# Patient Record
Sex: FEMALE | Race: AMERICAN INDIAN OR ALASKA NATIVE
[De-identification: names, ages, dates, MRNs, and addresses within clinical notes are randomized per-mention and may not be internally consistent; named-entity substitution may affect disease eponyms.]

---

## 2020-11-07 ENCOUNTER — HOSPITAL ENCOUNTER (EMERGENCY)
Dept: HOSPITAL 5 - ED | Age: 30
Discharge: HOME | End: 2020-11-07
Payer: MEDICAID

## 2020-11-07 VITALS — SYSTOLIC BLOOD PRESSURE: 103 MMHG | DIASTOLIC BLOOD PRESSURE: 69 MMHG

## 2020-11-07 DIAGNOSIS — F17.200: ICD-10-CM

## 2020-11-07 DIAGNOSIS — N39.0: Primary | ICD-10-CM

## 2020-11-07 DIAGNOSIS — Z98.890: ICD-10-CM

## 2020-11-07 DIAGNOSIS — N83.201: ICD-10-CM

## 2020-11-07 DIAGNOSIS — Z79.899: ICD-10-CM

## 2020-11-07 DIAGNOSIS — Z88.6: ICD-10-CM

## 2020-11-07 DIAGNOSIS — N93.8: ICD-10-CM

## 2020-11-07 LAB
BASOPHILS # (AUTO): 0 K/MM3 (ref 0–0.1)
BASOPHILS NFR BLD AUTO: 1 % (ref 0–1.8)
BILIRUB UR QL STRIP: (no result)
BLOOD UR QL VISUAL: (no result)
EOSINOPHIL # BLD AUTO: 0.1 K/MM3 (ref 0–0.4)
EOSINOPHIL NFR BLD AUTO: 2.1 % (ref 0–4.3)
HCT VFR BLD CALC: 32.7 % (ref 30.3–42.9)
HGB BLD-MCNC: 10.1 GM/DL (ref 10.1–14.3)
LYMPHOCYTES # BLD AUTO: 2.3 K/MM3 (ref 1.2–5.4)
LYMPHOCYTES NFR BLD AUTO: 47.2 % (ref 13.4–35)
MCHC RBC AUTO-ENTMCNC: 31 % (ref 30–34)
MCV RBC AUTO: 71 FL (ref 79–97)
MONOCYTES # (AUTO): 0.4 K/MM3 (ref 0–0.8)
MONOCYTES % (AUTO): 7.7 % (ref 0–7.3)
MUCOUS THREADS #/AREA URNS HPF: (no result) /HPF
PH UR STRIP: 5 [PH] (ref 5–7)
PLATELET # BLD: 281 K/MM3 (ref 140–440)
RBC # BLD AUTO: 4.63 M/MM3 (ref 3.65–5.03)
RBC #/AREA URNS HPF: > 182 /HPF (ref 0–6)
UROBILINOGEN UR-MCNC: < 2 MG/DL (ref ?–2)
WBC #/AREA URNS HPF: 18 /HPF (ref 0–6)

## 2020-11-07 PROCEDURE — 86900 BLOOD TYPING SEROLOGIC ABO: CPT

## 2020-11-07 PROCEDURE — 87086 URINE CULTURE/COLONY COUNT: CPT

## 2020-11-07 PROCEDURE — 36415 COLL VENOUS BLD VENIPUNCTURE: CPT

## 2020-11-07 PROCEDURE — 85025 COMPLETE CBC W/AUTO DIFF WBC: CPT

## 2020-11-07 PROCEDURE — 76856 US EXAM PELVIC COMPLETE: CPT

## 2020-11-07 PROCEDURE — 86901 BLOOD TYPING SEROLOGIC RH(D): CPT

## 2020-11-07 PROCEDURE — 76830 TRANSVAGINAL US NON-OB: CPT

## 2020-11-07 PROCEDURE — 86850 RBC ANTIBODY SCREEN: CPT

## 2020-11-07 PROCEDURE — 81001 URINALYSIS AUTO W/SCOPE: CPT

## 2020-11-07 PROCEDURE — 84702 CHORIONIC GONADOTROPIN TEST: CPT

## 2020-11-07 NOTE — EVENT NOTE
ED Screening Note


Date of service: 11/07/20


Time: 15:11


ED Screening Note: 


30-year-old -American female presents to the emergency room for abdominal

pain with cramping vaginal bleeding.  Last menstrual period 10/25/2020. 10-15 

pads son far today. Having blood clots. On no birth control. no OB/GYN. 





This initial assessment/diagnostic orders/clinical plan/treatment(s) is/are 

subject to change based on patients health status, clinical progression and re-

assessment by fellow clinical providers in the ED. Further treatment and workup 

at subsequent clinical providers discretion. Patient/guardian urged not to elope

from the ED as their condition may be serious if not clinically assessed and 

managed. 





Initial orders include:

## 2020-11-07 NOTE — ULTRASOUND REPORT
ULTRASOUND PELVIS  



INDICATION / CLINICAL INFORMATION: 

Pelvic pain.



TECHNIQUE:

Transabdominal and Transvaginal.

Duplex Color Doppler used: Yes. 



COMPARISON: 

None available



FINDINGS:

UTERUS: The uterus measures 7.1 cm in length. The endometrial stripe measures 14 mm. Uterus is retrov
erted. There is a small abutting cysts at the cervix.



RIGHT ADNEXA: The right ovary measures 5 cm in length. There is a 3.9 cm complex cyst in the right ov
heber. Normal color Doppler blood flow.



LEFT ADNEXA: Small simple cyst is noted in the left ovary. The left ovary measures 3.2 cm Normal colo
r Doppler blood flow.



URINARY BLADDER: No significant abnormality. 

FREE FLUID: Small amount is noted

ADDITIONAL FINDINGS: None.



IMPRESSION:

1. There is a 3.9 cm complex cyst in the right ovary.



Signer Name: Charles Dasilva MD 

Signed: 11/7/2020 5:19 PM

Workstation Name: [id number]

## 2020-11-07 NOTE — EMERGENCY DEPARTMENT REPORT
HPI





- General


Chief Complaint: Abdominal Pain


Time Seen by Provider: 20 15:10





- HPI


HPI: 





This is a 30-year-old -American female who presents to the emergency 

department with a complaint of prolonged menstrual cycle causing heavy vaginal 

bleeding, nausea and vomiting, and pelvic cramping/discomfort.  Currently her 

pain is 5 out of 10 in intensity.  The patient started her last menstrual cycle 

on .  She denies any fever, dysuria, vaginal discharge, back pain.  

Patient has a history of a previous ovarian cysts and subsequent surgical 

removal of that cyst.  Patient says "I did some research on the Internet and I 

think I could have fibroids", but the patient denies any known history of 

fibroids.  She does not have any OB/GYN.





ED Past Medical Hx





- Past Medical History


Previous Medical History?: Yes


Additional medical history: Abd pain, Vaginal delivery x 1, Abnormal uterine 

bleeding and heavy menses





- Surgical History


Past Surgical History?: Yes


Additional Surgical History:  x 1





- Social History


Smoking Status: Current Every Day Smoker


Substance Use Type: Alcohol





- Medications


Home Medications: 


                                Home Medications











 Medication  Instructions  Recorded  Confirmed  Last Taken  Type


 


Docusate Sodium [Colace CAP] 100 mg PO BID PRN #30 capsule 18  Unknown Rx


 


Ibuprofen [Motrin] 800 mg PO Q8HR PRN #20 tablet 18  Unknown Rx


 


polyethylene glycoL 3350 [Miralax 17 gm PO QDAY PRN #1 box 18  Unknown Rx





3350]     


 


HYDROcodone/APAP 5-325 [[place] 1 each PO Q6HR PRN #10 tablet 19  Unknown Rx





5/325]     


 


HYDROcodone/APAP 5-325 [[place] 1 each PO Q6HR PRN #8 tablet 20  Unknown Rx





5/325]     


 


Metoclopramide [Reglan] 10 mg PO TID PRN #15 tab 20  Unknown Rx


 


Nitrofurantoin Mono/M-Cryst 100 mg PO Q12HR #14 capsule 20  Unknown Rx





[Macrobid CAP]     


 


medroxyPROGESTERone ACETATE 10 mg PO QDAY #7 tablet 20  Unknown Rx





[Provera]     














ED Review of Systems


ROS: 


Stated complaint: FIBROIDS/SEVERE PAIN


Other details as noted in HPI





Comment: All other systems reviewed and negative


Constitutional: denies: chills, fever


Eyes: denies: eye pain, vision change


ENT: denies: ear pain, throat pain


Respiratory: denies: cough, shortness of breath


Cardiovascular: denies: chest pain, palpitations


Gastrointestinal: abdominal pain, nausea, vomiting


Genitourinary: abnormal menses.  denies: dysuria, discharge


Musculoskeletal: denies: back pain, arthralgia


Skin: denies: rash, lesions


Neurological: denies: headache, weakness





Physical Exam





- Physical Exam


Vital Signs: 


                                   Vital Signs











  20





  15:00


 


Temperature 98.3 F


 


Pulse Rate 68


 


Respiratory 20





Rate 


 


Blood Pressure 100/65





[Right] 


 


O2 Sat by Pulse 100





Oximetry 











Physical Exam: 





GENERAL: The patient is well-developed well-nourished.


HENT: Normocephalic.  Atraumatic.    Patient has moist mucous membranes.


EYES: Extraocular motions are intact.  


NECK: Supple. Trachea is midline.


CHEST/LUNGS: Clear to auscultation.  There is no respiratory distress noted.


HEART/CARDIOVASCULAR: Regular.  There is no tachycardia. 


ABDOMEN: Abdomen is soft, nontender.  Patient has normal bowel sounds.  There is

 no abdominal distention.


SKIN: Skin is warm and dry. 


NEURO: The patient is awake, alert, and oriented.  The patient is cooperative.  

Normal speech.


MUSCULOSKELETAL: There is no tenderness or deformity.  There is no limitation 

range of motion.  





ED Course


                                   Vital Signs











  20





  15:00


 


Temperature 98.3 F


 


Pulse Rate 68


 


Respiratory 20





Rate 


 


Blood Pressure 100/65





[Right] 


 


O2 Sat by Pulse 100





Oximetry 














- Reevaluation(s)


Reevaluation #1: 





20 18:40


                                   Lab Results











  20 Range/Units





  15:18 15:18 15:18 


 


WBC  4.8    (4.5-11.0)  K/mm3


 


RBC  4.63    (3.65-5.03)  M/mm3


 


Hgb  10.1    (10.1-14.3)  gm/dl


 


Hct  32.7    (30.3-42.9)  %


 


MCV  71 L    (79-97)  fl


 


MCH  22 L    (28-32)  pg


 


MCHC  31    (30-34)  %


 


RDW  21.1 H    (13.2-15.2)  %


 


Plt Count  281    (140-440)  K/mm3


 


Lymph % (Auto)  47.2 H    (13.4-35.0)  %


 


Mono % (Auto)  7.7 H    (0.0-7.3)  %


 


Eos % (Auto)  2.1    (0.0-4.3)  %


 


Baso % (Auto)  1.0    (0.0-1.8)  %


 


Lymph # (Auto)  2.3    (1.2-5.4)  K/mm3


 


Mono # (Auto)  0.4    (0.0-0.8)  K/mm3


 


Eos # (Auto)  0.1    (0.0-0.4)  K/mm3


 


Baso # (Auto)  0.0    (0.0-0.1)  K/mm3


 


Seg Neutrophils %  42.0    (40.0-70.0)  %


 


Seg Neutrophils #  2.0    (1.8-7.7)  K/mm3


 


HCG, Quant   < 2   (0-4)  mIU/mL


 


Urine Color     (Yellow)  


 


Urine Turbidity     (Clear)  


 


Urine pH     (5.0-7.0)  


 


Ur Specific Gravity     (1.003-1.030)  


 


Urine Protein     (Negative)  mg/dL


 


Urine Glucose (UA)     (Negative)  mg/dL


 


Urine Ketones     (Negative)  mg/dL


 


Urine Blood     (Negative)  


 


Urine Nitrite     (Negative)  


 


Urine Bilirubin     (Negative)  


 


Urine Urobilinogen     (<2.0)  mg/dL


 


Ur Leukocyte Esterase     (Negative)  


 


Urine WBC (Auto)     (0.0-6.0)  /HPF


 


Urine RBC (Auto)     (0.0-6.0)  /HPF


 


U Epithel Cells (Auto)     (0-13.0)  /HPF


 


Urine Mucus     /HPF


 


Blood Type    O POSITIVE  


 


Antibody Screen    Negative  














  20 Range/Units





  17:10 


 


WBC   (4.5-11.0)  K/mm3


 


RBC   (3.65-5.03)  M/mm3


 


Hgb   (10.1-14.3)  gm/dl


 


Hct   (30.3-42.9)  %


 


MCV   (79-97)  fl


 


MCH   (28-32)  pg


 


MCHC   (30-34)  %


 


RDW   (13.2-15.2)  %


 


Plt Count   (140-440)  K/mm3


 


Lymph % (Auto)   (13.4-35.0)  %


 


Mono % (Auto)   (0.0-7.3)  %


 


Eos % (Auto)   (0.0-4.3)  %


 


Baso % (Auto)   (0.0-1.8)  %


 


Lymph # (Auto)   (1.2-5.4)  K/mm3


 


Mono # (Auto)   (0.0-0.8)  K/mm3


 


Eos # (Auto)   (0.0-0.4)  K/mm3


 


Baso # (Auto)   (0.0-0.1)  K/mm3


 


Seg Neutrophils %   (40.0-70.0)  %


 


Seg Neutrophils #   (1.8-7.7)  K/mm3


 


HCG, Quant   (0-4)  mIU/mL


 


Urine Color  Red  (Yellow)  


 


Urine Turbidity  Cloudy  (Clear)  


 


Urine pH  5.0  (5.0-7.0)  


 


Ur Specific Gravity  1.023  (1.003-1.030)  


 


Urine Protein  100 mg/dl  (Negative)  mg/dL


 


Urine Glucose (UA)  Neg  (Negative)  mg/dL


 


Urine Ketones  Neg  (Negative)  mg/dL


 


Urine Blood  Lg  (Negative)  


 


Urine Nitrite  Neg  (Negative)  


 


Urine Bilirubin  Neg  (Negative)  


 


Urine Urobilinogen  < 2.0  (<2.0)  mg/dL


 


Ur Leukocyte Esterase  Tr  (Negative)  


 


Urine WBC (Auto)  18.0 H  (0.0-6.0)  /HPF


 


Urine RBC (Auto)  > 182.0  (0.0-6.0)  /HPF


 


U Epithel Cells (Auto)  11.0  (0-13.0)  /HPF


 


Urine Mucus  2+  /HPF


 


Blood Type   


 


Antibody Screen   











Reevaluation #2: 





20 18:40


                                   Vital Signs











  20





  15:00 18:00


 


Temperature 98.3 F 


 


Pulse Rate 68 51 L


 


Respiratory 20 





Rate  


 


Blood Pressure  103/69


 


Blood Pressure 100/65 





[Right]  


 


O2 Sat by Pulse 100 99





Oximetry  














ED Medical Decision Making





- Lab Data


Result diagrams: 


                                 20 15:18








- Radiology Data


Radiology results: report reviewed





ULTRASOUND PELVIS INDICATION / CLINICAL INFORMATION: Pelvic pain. TECHNIQUE: Tr

ansabdominal and Transvaginal. Duplex Color Doppler used: Yes. COMPARISON: None 

available FINDINGS: UTERUS: The uterus measures 7.1 cm in length. The 

endometrial stripe measures 14 mm. Uterus is retroverted. There is a small 

abutting cysts at the cervix. RIGHT ADNEXA: The right ovary measures 5 cm in 

length. There is a 3.9 cm complex cyst in the right ovary. Normal color Doppler 

blood flow. LEFT ADNEXA: Small simple cyst is noted in the left ovary. The left 

ovary measures 3.2 cm Normal color Doppler blood flow. URINARY BLADDER: No 

significant abnormality. FREE FLUID: Small amount is noted ADDITIONAL FINDINGS: 

None. IMPRESSION: 1. There is a 3.9 cm complex cyst in the right ovary. 





- Medical Decision Making





This patient presents with a prolonged menstrual cycle with heavy bleeding that 

is been going on since , along with pelvic cramping pain.  Patient's 

labs have been mostly unremarkable.  Hemoglobin appears stable at greater than 

10.  Patient is not pregnant.  No leukocytosis.  The patient does have some 

hematuria and a mild urinary tract infection.  A pelvic ultrasound was completed

 that did not show any signs of torsion or any fibroids but did show a 3.9 cm 

complex cyst to the right ovary.  Patient was given Reglan for her nausea, some 

ibuprofen and a Norco for her discomfort.  There has been no vomiting while in 

the emergency department.  Her vital signs have been reassuring throughout her 

ED course including being afebrile.  For all these reasons the patient appears 

safe for discharge home at this time.  She has been given multiple outpatient 

referrals for OB/GYN groups.  She will placed on a 1 week course of Provera for 

the dysfunctional uterine bleeding.  She has been given a prescription for pain 

medication and antiemetics.  She will return to the emergency department with 

any worsening of her symptoms or with any acute distress.


Critical Care Time: No


Critical care attestation.: 


If time is entered above; I have spent that time in minutes in the direct care o

f this critically ill patient, excluding procedure time.








ED Disposition


Clinical Impression: 


 Dysfunctional uterine bleeding





Ovarian cyst


Qualifiers:


 Laterality: right Qualified Code(s): N83.201 - Unspecified ovarian cyst, right 

side





UTI (urinary tract infection)


Qualifiers:


 Urinary tract infection type: acute cystitis Hematuria presence: with hematuria

 Qualified Code(s): N30.01 - Acute cystitis with hematuria





Disposition:  TO HOME OR SELFCARE


Is pt being admited?: No


Condition: Stable


Instructions:  Urinary Tract Infection, Adult, Ovarian Cyst, Dysfunctional 

Uterine Bleeding, Abdominal Pain (ED)


Additional Instructions: 


Please follow-up with an OB/GYN in the next few days.  I am giving you a 

referral for multiple local OB/GYN groups.





Take the medications as prescribed.





Return to the emergency department with any worsening of your symptoms, new or 

concerning symptoms not addressed during this current emergency department 

visit, or with any acute distress.





You have been prescribed a medication that is sedating and therefore should not 

be taken prior to driving, working, and responsible for children and in no way 

should be mixed with alcohol of any quantity.


Prescriptions: 


Nitrofurantoin Mono/M-Cryst [Macrobid CAP] 100 mg PO Q12HR #14 capsule


HYDROcodone/APAP 5-325 [[place] 5/325] 1 each PO Q6HR PRN #8 tablet


 PRN Reason: Pain


medroxyPROGESTERone ACETATE [Provera] 10 mg PO QDAY #7 tablet


Metoclopramide [Reglan] 10 mg PO TID PRN #15 tab


 PRN Reason: Nausea


Referrals: 


[facility] [Provider Group] - 3-5 Days


 [facility] [Provider Group] - 3-5 Days


[facility] [Provider Group] - 3-5 Days


Time of Disposition: 18:03